# Patient Record
Sex: MALE | Race: WHITE | Employment: OTHER | URBAN - METROPOLITAN AREA
[De-identification: names, ages, dates, MRNs, and addresses within clinical notes are randomized per-mention and may not be internally consistent; named-entity substitution may affect disease eponyms.]

---

## 2024-01-16 ENCOUNTER — OFFICE VISIT (OUTPATIENT)
Dept: URGENT CARE | Facility: CLINIC | Age: 39
End: 2024-01-16
Payer: COMMERCIAL

## 2024-01-16 VITALS
RESPIRATION RATE: 19 BRPM | WEIGHT: 145 LBS | HEIGHT: 67 IN | OXYGEN SATURATION: 96 % | BODY MASS INDEX: 22.76 KG/M2 | HEART RATE: 64 BPM | DIASTOLIC BLOOD PRESSURE: 88 MMHG | SYSTOLIC BLOOD PRESSURE: 166 MMHG | TEMPERATURE: 98 F

## 2024-01-16 DIAGNOSIS — A54.9 GONORRHEA: Primary | ICD-10-CM

## 2024-01-16 PROCEDURE — 99203 OFFICE O/P NEW LOW 30 MIN: CPT | Mod: 25,S$GLB,, | Performed by: NURSE PRACTITIONER

## 2024-01-16 PROCEDURE — 96372 THER/PROPH/DIAG INJ SC/IM: CPT | Mod: S$GLB,,, | Performed by: NURSE PRACTITIONER

## 2024-01-16 RX ORDER — CEFTRIAXONE 500 MG/1
500 INJECTION, POWDER, FOR SOLUTION INTRAMUSCULAR; INTRAVENOUS
Status: COMPLETED | OUTPATIENT
Start: 2024-01-16 | End: 2024-01-16

## 2024-01-16 RX ORDER — EMTRICITABINE AND TENOFOVIR DISOPROXIL FUMARATE 200; 300 MG/1; MG/1
1 TABLET, FILM COATED ORAL DAILY
COMMUNITY

## 2024-01-16 RX ORDER — VALACYCLOVIR HYDROCHLORIDE 1 G/1
4000 TABLET, FILM COATED ORAL ONCE
COMMUNITY
Start: 2024-01-02

## 2024-01-16 RX ORDER — MESALAMINE 1.2 G/1
2.4 TABLET, DELAYED RELEASE ORAL
COMMUNITY
Start: 2023-12-06 | End: 2024-01-16

## 2024-01-16 RX ADMIN — CEFTRIAXONE 500 MG: 500 INJECTION, POWDER, FOR SOLUTION INTRAMUSCULAR; INTRAVENOUS at 04:01

## 2024-01-16 NOTE — PROGRESS NOTES
"Subjective:      Patient ID: Vladimir Gasca is a 38 y.o. male.    Vitals:  height is 5' 7" (1.702 m) and weight is 65.8 kg (145 lb). His oral temperature is 98 °F (36.7 °C). His blood pressure is 166/88 (abnormal) and his pulse is 64. His respiration is 19 and oxygen saturation is 96%.     Chief Complaint: Exposure to STD    Patient presents with gonorrhea of the throat. Patient already have test results and needs to be treated. Visiting from out of town unable to get into actual doctor until Sunday.    Provider note begins below    Patient denies any sore throat or dysuria.  He received a call from his PCP in [place] that his oropharyngeal swab came back positive for gonorrhea.  He has notified his partner.  Denies any dysuria.    Exposure to STD  The current episode started today. Pertinent negatives include no chest pain, constipation, coughing, diarrhea, fever, nausea, shortness of breath, sore throat or vomiting.       Constitution: Negative for sweating, fatigue and fever.   HENT:  Negative for sore throat.    Cardiovascular:  Negative for chest pain and sob on exertion.   Respiratory:  Negative for cough and shortness of breath.    Gastrointestinal:  Negative for nausea, vomiting, constipation and diarrhea.      Objective:     Physical Exam   Constitutional: He is oriented to person, place, and time.   HENT:   Head: Normocephalic and atraumatic.   Mouth/Throat: Posterior oropharyngeal erythema present.   Cardiovascular: Normal rate.   Pulmonary/Chest: Effort normal. No respiratory distress.   Abdominal: Normal appearance.   Neurological: He is alert and oriented to person, place, and time.   Skin: Skin is dry.   Psychiatric: His behavior is normal. Mood normal.       Assessment:     1. Gonorrhea        Plan:   Rocephin injection given in clinic today for pt reported positive oral pharyngeal test for gonorrhea    Patient already has a routine swabs scheduled every 3 months he will have when he returns home " to [place]       Increase condom use to prevent further occurance.    Notify sexual partners of the need for testing.  Complete ALL medication prescribed.  NO sexual intercourse for 7 days after treatment.   Retest to insure infection has cleared-there are infections that require more agressive treatment.  Retest for all in 6 weeks.  REMEMBER WEAR CONDOMS AND GET TESTED OFTEN.      Gonorrhea  -     cefTRIAXone injection 500 mg

## 2024-01-16 NOTE — PATIENT INSTRUCTIONS
Increase condom use to prevent further occurance.    Notify sexual partners of the need for testing.  Complete ALL medication prescribed.  NO sexual intercourse for 7 days after treatment.   Retest to insure infection has cleared-there are infections that require more agressive treatment.  Retest for all in 6 weeks.  REMEMBER WEAR CONDOMS AND GET TESTED OFTEN.